# Patient Record
(demographics unavailable — no encounter records)

---

## 2024-11-22 NOTE — ASSESSMENT
[FreeTextEntry1] : Rec'd Flu vaccine Labs EKG -NSR No change w/tx No refills needed All questions answered Re check 3 months

## 2024-11-22 NOTE — HISTORY OF PRESENT ILLNESS
[FreeTextEntry1] : Here for Rx mgmt Feeling well  No fever chills cough wheeze CP SOB palpitations n/v/d/Ha No additional complaints [de-identified] : Rx Mgmt

## 2024-11-22 NOTE — REVIEW OF SYSTEMS
[Fever] : no fever [Chills] : no chills [Fatigue] : no fatigue [Discharge] : no discharge [Pain] : no pain [Redness] : no redness [Earache] : no earache [Hearing Loss] : no hearing loss [Hoarseness] : no hoarseness [Postnasal Drip] : no postnasal drip [Chest Pain] : no chest pain [Palpitations] : no palpitations [Shortness Of Breath] : no shortness of breath [Wheezing] : no wheezing [Cough] : no cough [Abdominal Pain] : no abdominal pain [Nausea] : no nausea [Constipation] : no constipation [Vomiting] : no vomiting [Dysuria] : no dysuria [Incontinence] : no incontinence [Frequency] : no frequency [Joint Pain] : no joint pain [Itching] : no itching [Skin Rash] : no skin rash [Headache] : no headache [Dizziness] : no dizziness

## 2025-02-14 NOTE — PLAN
[FreeTextEntry1] : Saw Optho and GYN Mammo 6/25 Renew Zocor 40 mg HS and Inc Avapro 300 mg daily Discussed Low Na Sat fat refined CHO diet Wants to schedule colonoscopy after returns from [place] Lost 2 lbs - Discussed Low Na Sat Fat refined CHO diet Labs next visit All questions answered Re check 3 months

## 2025-02-14 NOTE — REVIEW OF SYSTEMS
[Fever] : no fever [Chills] : no chills [Fatigue] : no fatigue [Discharge] : no discharge [Pain] : no pain [Itching] : no itching [Earache] : no earache [Hearing Loss] : no hearing loss [Sore Throat] : no sore throat [Chest Pain] : no chest pain [Palpitations] : no palpitations [Lower Ext Edema] : no lower extremity edema [Shortness Of Breath] : no shortness of breath [Wheezing] : no wheezing [Cough] : no cough [Abdominal Pain] : no abdominal pain [Nausea] : no nausea [Constipation] : no constipation [Diarrhea] : diarrhea [Vomiting] : no vomiting [Dysuria] : no dysuria [Incontinence] : no incontinence [Frequency] : no frequency [Joint Pain] : no joint pain [Itching] : no itching [Skin Rash] : no skin rash [Headache] : no headache [Dizziness] : no dizziness

## 2025-02-14 NOTE — HISTORY OF PRESENT ILLNESS
[FreeTextEntry1] : Patient notes LE cramping when sleeping - x 1 month No fever chills cough wheeze No n/v/d/Ha No CP SOB palpitations No additional complaints Inc anxiety [de-identified] : Rx Mgmt

## 2025-06-12 NOTE — ASSESSMENT
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  S/P WLE and partial RT.  Adjuvant treatment with antiestrogen, Perera vs AI were discussed in detail including differences between 2 types of drugs as well as risks, benefits and side effects including, but not limited to menopausal symptoms, musculoskeletal complaints for AI, in addition worsening of BMD, CHOL and BP.  For Perera, slight increase in uterine CA and thromboembolic events.  Reviewed  - cont anastrazole 12/23.  Pt has healthcare proxy ..  Cont  exercise and. wt reduction .W/u ov abn reportedly neg as per son - will forward eval.  Diagnostic bilat MMG/US 8/25. F/U BMDf/u6 6 mo [AdvancecareDate] : 6/12/25

## 2025-06-12 NOTE — BEGINNING OF VISIT
[0] : 2) Feeling down, depressed, or hopeless: Not at all (0) [Never] : Never [Date Discussed (MM/DD/YY): ___] : Discussed: [unfilled] [With Patient/Caregiver] : with Patient/Caregiver [[id number]] : 0 [Abdominal Pain] : no abdominal pain [Vomiting] : no vomiting [Constipation] : no constipation

## 2025-06-12 NOTE — BEGINNING OF VISIT
[0] : 2) Feeling down, depressed, or hopeless: Not at all (0) [Never] : Never [Date Discussed (MM/DD/YY): ___] : Discussed: [unfilled] [With Patient/Caregiver] : with Patient/Caregiver [[id number]] : 0 [Abdominal Pain] : no abdominal pain [Vomiting] : no vomiting [Constipation] : no constipation

## 2025-06-12 NOTE — ASSESSMENT
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  S/P WLE and partial RT.  Adjuvant treatment with antiestrogen, Perera vs AI were discussed in detail including differences between 2 types of drugs as well as risks, benefits and side effects including, but not limited to menopausal symptoms, musculoskeletal complaints for AI, in addition worsening of BMD, CHOL and BP.  For Perera, slight increase in uterine CA and thromboembolic events.  Reviewed  - cont anastrazole 12/23.  Pt has healthcare proxy ..  Cont  exercise and. wt reduction .W/u ov abn reportedly neg as per son - will forward eval.  Diagnostic bilat MMG/US 8/25. F/U BMDf/u6 6 mo [AdvancecareDate] : 6/12/25

## 2025-06-12 NOTE — END OF VISIT
[Time Spent: ___ minutes] : I have spent [unfilled] minutes of time on the encounter which excludes teaching and separately reported services.
no

## 2025-06-12 NOTE — HISTORY OF PRESENT ILLNESS
[de-identified] : [place] [facility] revealed an irregular 8 mm mass UOQ and a 2nd 5 mm mass Lt LIQ.  Bilat mammo, additional imaging revealed 2nd lesion to be a benign intramammary LN.  Core bx mod diff invasive CA grade 2 (3, 2, 1), with mucinous features, 8 mm vs 9 mm, ER and OR 99% and HER-2/eliezer neg on 23.  Bilat MRI 23 index lesion 1.2 cm at 2 to 3 o'clock with bx marker, additional area of linear non-mass enhancement.  Pt underwent MRI-guided localized WLE on 23, 7 mm mod diff mucinous IDC grade 2 (3, 2, 2), bx site changes present, final margins neg, 1 SLN neg, ER and %, Ki-57 5%-10% and HER-2 0.  Had partial breast RT - no signif SE.  On ANASTRAZOLE since Mid Dec 23.Tolerating well She presents today for further eval.   ALLERGIES: NKA.  PMH: Menarche 14, LMP 55, , GYN , no hx exogenous hormones,  18 and 26,  for 6 M.  Hx HTN, elevated CHOL.  Had  flu vax.  Bilat cataract removal. [id number]/2018 - polyp.  BMD23 - wnl  Hx GERD.  FHX: [place].  Mother  58, had 4 sisters and 1 brother.  No extended family hx of CA.  Father  80 and had 2 sisters.  No extended family hx of CA.  Pt has 1 brother, 1 son and 1 daughter.  SHX: Homemaker, lives with her  who is on dialysis, no tobacco or alcohol, treadmill and bicycle 3-4 times/week.   [de-identified] : Last colonoscopy: ~2017 Last GYN: 1/24 - ov mass - US  3/15 inconclusive - Had MRi and ca125 - pending  6/12/25 BMD 5/23/23 @ [facility] - normal bone mass, Rt femoral neck decreased, needs imaging this year MMG/US 8/27/24 @ [facility] - mena, annual f/u continues anastrozole (started 12/2023) - tolerating well occ pains in thumbs colonoscopy 2018 - plans to f/u w/ Dr. Ken  saw gyn 1/2025 Dr. Meier - pap smear/exam normal- repeortedly w/u ov abn was unrevealing saw PCP Dr. Ken 2/15/25 - rx management, labs, etc. just came back from [place] last week - was there for 2 months occ joint pains in b/l thumbs had UTI in [place] - got antbx, resolved occ twinging/prickly sensation in b/l nipples weight/appetite stable treadmill, bicycle x 30 minutes/day, walked a lot in [place] no EtOH  passed away 2023, lives independently- family neaermaddy

## 2025-06-12 NOTE — HISTORY OF PRESENT ILLNESS
[de-identified] : Bilat mammo and US done at [facility] in May revealed an irregular 8 mm mass UOQ and a 2nd 5 mm mass Lt LIQ.  Bilat mammo, additional imaging revealed 2nd lesion to be a benign intramammary LN.  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  Bilat MRI 23 index lesion 1.2 cm at 2 to 3 o'clock with bx marker, additional area of linear non-mass enhancement.  Pt underwent MRI-guided localized WLE on 23, 7 mm mod diff mucinous IDC grade 2 (3, 2, 2), bx site changes present, final margins neg, 1 SLN neg, ER and %, Ki-57 5%-10% and HER-2 0.  Had partial breast RT - no signif SE.  On ANASTRAZOLE since Mid Dec 23.Tolerating well She presents today for further eval.   ALLERGIES: NKA.  PMH: Menarche 14, LMP 55, , GYN , no hx exogenous hormones,  18 and 26,  for 6 M.  Hx HTN, elevated CHOL.  Had  flu vax.  Bilat cataract removal. [id number]/2018 - polyp.  BMD23 - wnl  Hx GERD.  FHX: [place].  Mother  58, had 4 sisters and 1 brother.  No extended family hx of CA.  Father  80 and had 2 sisters.  No extended family hx of CA.  Pt has 1 brother, 1 son and 1 daughter.  SHX: Homemaker, lives with her  who is on dialysis, no tobacco or alcohol, treadmill and bicycle 3-4 times/week.   [de-identified] : Last colonoscopy: ~2017 Last GYN: 1/24 - ov mass - US  3/15 inconclusive - Had MRi and ca125 - pending  6/12/25 BMD 5/23/23 @ [facility] - normal bone mass, Rt femoral neck decreased, needs imaging this year MMG/US 8/27/24 @ [facility] - mena, annual f/u continues anastrozole (started 12/2023) - tolerating well occ pains in thumbs colonoscopy 2018 - plans to f/u w/ Dr. Ken  saw gyn 1/2025 Dr. Meier - pap smear/exam normal- repeortedly w/u ov abn was unrevealing saw PCP Dr. Ken 2/15/25 - rx management, labs, etc. just came back from [place] last week - was there for 2 months occ joint pains in b/l thumbs had UTI in [place] - got antbx, resolved occ twinging/prickly sensation in b/l nipples weight/appetite stable treadmill, bicycle x 30 minutes/day, walked a lot in [place] no EtOH  passed away 2023, lives independently- family neaermaddy

## 2025-06-12 NOTE — BEGINNING OF VISIT
[0] : 2) Feeling down, depressed, or hopeless: Not at all (0) [Never] : Never [Date Discussed (MM/DD/YY): ___] : Discussed: [unfilled] [With Patient/Caregiver] : with Patient/Caregiver [[id number]] : 0 [Abdominal Pain] : no abdominal pain [Vomiting] : no vomiting [Constipation] : no constipation

## 2025-06-12 NOTE — ASSESSMENT
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  S/P WLE and partial RT.  Adjuvant treatment with antiestrogen, Perera vs AI were discussed in detail including differences between 2 types of drugs as well as risks, benefits and side effects including, but not limited to menopausal symptoms, musculoskeletal complaints for AI, in addition worsening of BMD, CHOL and BP.  For Perera, slight increase in uterine CA and thromboembolic events.  Reviewed  - cont anastrazole 12/23.  Pt has healthcare proxy ..  Cont  exercise and. wt reduction .W/u ov abn reportedly neg as per son - will forward eval.  Diagnostic bilat MMG/US 8/25. F/U BMDf/u6 6 mo [AdvancecareDate] : 6/12/25

## 2025-06-12 NOTE — BEGINNING OF VISIT
[0] : 2) Feeling down, depressed, or hopeless: Not at all (0) [Never] : Never [Date Discussed (MM/DD/YY): ___] : Discussed: [unfilled] [With Patient/Caregiver] : with Patient/Caregiver [[id number]] : 0 [Abdominal Pain] : no abdominal pain [Vomiting] : no vomiting [Constipation] : no constipation

## 2025-06-12 NOTE — HISTORY OF PRESENT ILLNESS
[de-identified] : Bilat mammo and US done at [facility] in May revealed an irregular 8 mm mass UOQ and a 2nd 5 mm mass Lt LIQ.  Bilat mammo, additional imaging revealed 2nd lesion to be a benign intramammary LN.  Core bx mod diff invasive CA grade 2 (3, 2, 1), with mucinous features, 8 mm vs 9 mm, ER and VT 99% and HER-2/eliezer neg on 23.  Bilat MRI 23 index lesion 1.2 cm at 2 to 3 o'clock with bx marker, additional area of linear non-mass enhancement.  Pt underwent MRI-guided localized WLE on 23, 7 mm mod diff mucinous IDC grade 2 (3, 2, 2), bx site changes present, final margins neg, 1 SLN neg, ER and %, Ki-57 5%-10% and HER-2 0.  Had partial breast RT - no signif SE.  On ANASTRAZOLE since Mid Dec 23.Tolerating well She presents today for further eval.   ALLERGIES: NKA.  PMH: Menarche 14, LMP 55, , GYN , no hx exogenous hormones,  18 and 26,  for 6 M.  Hx HTN, elevated CHOL.  Had  flu vax.  Bilat cataract removal. [id number]/2018 - polyp.  BMD23 - wnl  Hx GERD.  FHX: [place].  Mother  58, had 4 sisters and 1 brother.  No extended family hx of CA.  Father  80 and had 2 sisters.  No extended family hx of CA.  Pt has 1 brother, 1 son and 1 daughter.  SHX: Homemaker, lives with her  who is on dialysis, no tobacco or alcohol, treadmill and bicycle 3-4 times/week.   [de-identified] : Last colonoscopy: ~2017 Last GYN: 1/24 - ov mass - US  3/15 inconclusive - Had MRi and ca125 - pending  6/12/25 BMD 5/23/23 @ [facility] - normal bone mass, Rt femoral neck decreased, needs imaging this year MMG/US 8/27/24 @ [facility] - mena, annual f/u continues anastrozole (started 12/2023) - tolerating well occ pains in thumbs colonoscopy 2018 - plans to f/u w/ Dr. Ken  saw gyn 1/2025 Dr. Meier - pap smear/exam normal- repeortedly w/u ov abn was unrevealing saw PCP Dr. Ken 2/15/25 - rx management, labs, etc. just came back from [place] last week - was there for 2 months occ joint pains in b/l thumbs had UTI in [place] - got antbx, resolved occ twinging/prickly sensation in b/l nipples weight/appetite stable treadmill, bicycle x 30 minutes/day, walked a lot in [place] no EtOH  passed away 2023, lives independently- family neaermaddy

## 2025-06-12 NOTE — ASSESSMENT
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  S/P WLE and partial RT.  Adjuvant treatment with antiestrogen, Perera vs AI were discussed in detail including differences between 2 types of drugs as well as risks, benefits and side effects including, but not limited to menopausal symptoms, musculoskeletal complaints for AI, in addition worsening of BMD, CHOL and BP.  For Perera, slight increase in uterine CA and thromboembolic events.  Reviewed  - cont anastrazole 12/23.  Pt has healthcare proxy ..  Cont  exercise and. wt reduction .W/u ov abn reportedly neg as per son - will forward eval.  Diagnostic bilat MMG/US 8/25. F/U BMDf/u6 6 mo [AdvancecareDate] : 6/12/25

## 2025-06-12 NOTE — HISTORY OF PRESENT ILLNESS
[de-identified] : Bilat mammo and US done at [facility] in May revealed an irregular 8 mm mass UOQ and a 2nd 5 mm mass Lt LIQ.  Bilat mammo, additional imaging revealed 2nd lesion to be a benign intramammary LN.  Core bx mod diff invasive CA grade 2 (3, 2, 1), with mucinous features, 8 mm vs 9 mm, ER and NM 99% and HER-2/eliezer neg on 23.  Bilat MRI 23 index lesion 1.2 cm at 2 to 3 o'clock with bx marker, additional area of linear non-mass enhancement.  Pt underwent MRI-guided localized WLE on 23, 7 mm mod diff mucinous IDC grade 2 (3, 2, 2), bx site changes present, final margins neg, 1 SLN neg, ER and %, Ki-57 5%-10% and HER-2 0.  Had partial breast RT - no signif SE.  On ANASTRAZOLE since Mid Dec 23.Tolerating well She presents today for further eval.   ALLERGIES: NKA.  PMH: Menarche 14, LMP 55, , GYN , no hx exogenous hormones,  18 and 26,  for 6 M.  Hx HTN, elevated CHOL.  Had  flu vax.  Bilat cataract removal. [id number]/2018 - polyp.  BMD23 - wnl  Hx GERD.  FHX: [place].  Mother  58, had 4 sisters and 1 brother.  No extended family hx of CA.  Father  80 and had 2 sisters.  No extended family hx of CA.  Pt has 1 brother, 1 son and 1 daughter.  SHX: Homemaker, lives with her  who is on dialysis, no tobacco or alcohol, treadmill and bicycle 3-4 times/week.   [de-identified] : Last colonoscopy: ~2017 Last GYN: 1/24 - ov mass - US  3/15 inconclusive - Had MRi and ca125 - pending  6/12/25 BMD 5/23/23 @ [facility] - normal bone mass, Rt femoral neck decreased, needs imaging this year MMG/US 8/27/24 @ [facility] - mena, annual f/u continues anastrozole (started 12/2023) - tolerating well occ pains in thumbs colonoscopy 2018 - plans to f/u w/ Dr. Ken  saw gyn 1/2025 Dr. Meier - pap smear/exam normal- repeortedly w/u ov abn was unrevealing saw PCP Dr. Ken 2/15/25 - rx management, labs, etc. just came back from [place] last week - was there for 2 months occ joint pains in b/l thumbs had UTI in [place] - got antbx, resolved occ twinging/prickly sensation in b/l nipples weight/appetite stable treadmill, bicycle x 30 minutes/day, walked a lot in [place] no EtOH  passed away 2023, lives independently- family neaermaddy